# Patient Record
Sex: FEMALE | Race: WHITE | Employment: OTHER | URBAN - METROPOLITAN AREA
[De-identification: names, ages, dates, MRNs, and addresses within clinical notes are randomized per-mention and may not be internally consistent; named-entity substitution may affect disease eponyms.]

---

## 2021-10-12 ENCOUNTER — HOSPITAL ENCOUNTER (OUTPATIENT)
Dept: PHYSICAL THERAPY | Age: 61
Discharge: HOME | End: 2021-10-12
Payer: MEDICAID

## 2021-10-12 PROCEDURE — 97162 PT EVAL MOD COMPLEX 30 MIN: CPT | Performed by: PHYSICAL THERAPIST

## 2021-10-12 NOTE — PROGRESS NOTES
PT DAILY TREATMENT NOTE     Patient Name: Roseline Lombard  Date:10/12/2021  : 1960  [x]  Patient  Verified  Payor: Mariia Laine / Plan: [address] [place] [address] [place] [employer] / Product Type: HMO /    In time:124  Out time:155  Total Treatment Time (min): 31  Visit #: 1 of     Treatment Area: Other low back pain [M54.59]  Right thigh pain [M79.651]    SUBJECTIVE  Pain Level (0-10 scale): 3/10  Any medication changes, allergies to medications, adverse drug reactions, diagnosis change, or new procedure performed?: [x] No    [] Yes (see summary sheet for update)  Subjective functional status/changes:   [] No changes reported  Pt is a 64year old female with subjective complaints of deep glut pain that radiates down the R HS and into the calf that started on 2021. She reports that she was carrying a bag and the weight shifted as she was going down the stairs and she heard a pop in her leg. She reports immediate pain in the deep R glut down her posterior leg. She was diagnosed with HS strain and treated with massage therapy. She reports improvements however, she continues to have pain. Currently she rates her pain a 3/10 in the HS. Functional limitations include sitting, stair negotiation, driving in a car, and prolonged walking. Her symptoms are eased with rest/ice and attending massage therapy 1 time a week or 10 days. She had an X-ray of the pelvis which was negative. Negative for red flags.  FOTO: 39/100    OBJECTIVE    Modality rationale:    Min Type Additional Details    [] Estim:  []Unatt       []IFC  []Premod                        []Other:  []w/ice   []w/heat  Position:  Location:    [] Estim: []Att    []TENS instruct  []NMES                    []Other:  []w/US   []w/ice   []w/heat  Position:  Location:    []  Traction: [] Cervical       []Lumbar                       [] Prone          []Supine                       []Intermittent   []Continuous Lbs:  [] before manual  [] after manual    []  Ultrasound: []Continuous   [] Pulsed                           []1MHz   []3MHz W/cm2:  Location:    []  Iontophoresis with dexamethasone         Location: [] Take home patch   [] In clinic    []  Ice     []  heat  []  Ice massage  []  Laser   []  Anodyne Position:  Location:    []  Laser with stim  []  Other:  Position:  Location:    []  Vasopneumatic Device  Pre-treatment girth:   Post-treatment girth:   Measured at landmark location:  Pressure:       [] lo [] med [] hi   Temperature: [] lo [] med [] hi   [] Skin assessment post-treatment:  []intact []redness- no adverse reaction    []redness  adverse reaction:   Vasopnuematic compression justification:  Per bilateral girth measures taken and listed above the edema is considered significant and having an impact on the patient's strength, transfers, self care and ADLs    31 min [x]Eval                  []Re-Eval          With   [] TE   [] TA   [] neuro   [] other: Patient Education: [x] Review HEP    [] Progressed/Changed HEP based on:   [] positioning   [] body mechanics   [] transfers   [] heat/ice application    [] other:      Other Objective/Functional Measures:   Upon evaluation, pt ambulates with normalized gait pattern. Stair negotiation: pt ambulates with a step too pattern using B UE's for support. AROM of the lumbar spine is WNLs in all planes. Negative repeated movement testing of the lumbar spine. Pt is able to perform a supine bridge 50% with slight discomfort in the R HS. B HS length: -20 deg from vertical B. B hip strength is grossly a 4/5 except B hip extension is a 3+/5. B knee flexion strength is a 4/5 with pain on the R. SI is in good alignment.       Pain Level (0-10 scale) post treatment: 3/10    ASSESSMENT/Changes in Function:   [x]  See Plan of Care  []  See progress note/recertification  []  See Discharge Summary         Progress towards goals / Updated goals:  PER POC     PLAN  []  Upgrade activities as tolerated     [x]  Continue plan of care  [] Update interventions per flow sheet       []  Discharge due to:_  [x]  Other: 2-3x/week for 4 weeks    Justification for Eval Code Complexity:  Patient History : chronicity, history of low back pain  Examination see exam   Clinical Presentation: evolving  Clinical Decision Making : FOTO : [facility], DPT 10/12/2021  411 PM    Future Appointments   Date Time Provider Allison Yoon   10/14/2021 10:45 AM [facility]   10/19/2021 10:45 AM [facility] PT [facility]   10/21/2021 10:45 AM Elise Standing, PTA [facility]   10/25/2021 11:30 AM [facility] PT [facility]   10/26/2021  9:15 AM [facility]   10/28/2021  1:15 PM Elise Standing, PTA [facility]   11/1/2021 10:00 AM Sonny Yuan, [facility]OT [facility]   11/4/2021 11:30 AM Betty Wagner, PT [facility]   11/8/2021 10:00 AM MARIO Cord[facility]T [facility]   11/11/2021 11:30 AM Elise Standing, PTA [facility]

## 2021-10-12 NOTE — PROGRESS NOTES
[address] PHYSICAL THERAPY AT [facility]  [address] [facility] [address], Luan Rogers  Phone [phone]  Fax [phone] / [address] [address] PHYSICAL THERAPY SERVICES  Patient Name: Delmer Lopez : 1960   Medical   Diagnosis: Other low back pain [M54.59]  Right thigh pain [M79.651] Treatment Diagnosis: R HS strain   Onset Date: 21     Referral Source: Renato Lai MD [facility] 10/12/2021   Prior Hospitalization: See medical history Provider #: [id number]   Prior Level of Function: IND    Comorbidities: Heart disease, diabetes, HBP, scoliosis   Medications: Verified on Patient Summary List   The Plan of Care and following information is based on the information from the initial evaluation.   =================================================================================  Assessment / key information:  Pt is a 64year old female with subjective complaints of deep glut pain that radiates down the R HS and into the calf that started on 2021. She reports that she was carrying a bag and the weight shifted as she was going down the stairs and she heard a pop in her leg. She reports immediate pain in the deep R glut down her posterior leg. She was diagnosed with HS strain and treated with massage therapy. She reports improvements however, she continues to have pain. Currently she rates her pain a 3/10 in the HS. Functional limitations include sitting, stair negotiation, driving in a car, and prolonged walking. Her symptoms are eased with rest/ice and attending massage therapy 1 time a week or 10 days. She had an X-ray of the pelvis which was negative. Negative for red flags. FOTO: 39/100. Upon evaluation, pt ambulates with normalized gait pattern. Stair negotiation: pt ambulates with a step too pattern using B UE's for support. AROM of the lumbar spine is WNLs in all planes.  Negative repeated movement testing of the lumbar spine. Pt is able to perform a supine bridge 50% with slight discomfort in the R HS. B HS length: -20 deg from vertical B. B hip strength is grossly a 4/5 except B hip extension is a 3+/5. B knee flexion strength is a 4/5 with pain on the R. SI is in good alignment. Pt would benefit from a course of skilled PT to address above deficits to return to PLOF symptom free.   =================================================================================  Eval Complexity: History: HIGH Complexity :3+ comorbidities / personal factors will impact the outcome/ POC Exam:HIGH Complexity : 4+ Standardized tests and measures addressing body structure, function, activity limitation and / or participation in recreation  Presentation: MEDIUM Complexity : Evolving with changing characteristics  Clinical Decision Making:MEDIUM Complexity : FOTO score of 26-74Overall Complexity:MEDIUM  Problem List: pain affecting function, decrease strength, edema affecting function, impaired gait/ balance, decrease ADL/ functional abilitiies, decrease activity tolerance, decrease flexibility/ joint mobility and decrease transfer abilities   Treatment Plan may include any combination of the following: Therapeutic exercise, Therapeutic activities, Neuromuscular re-education, Physical agent/modality, Gait/balance training, Manual therapy and Self Care training  Patient / Family readiness to learn indicated by: asking questions and trying to perform skills  Persons(s) to be included in education: patient (P)  Barriers to Learning/Limitations: None  Measures taken:    Patient Goal (s): Pain free, learn stretches, drive for 1 hour, walk 2 miles   Patient self reported health status: fair  Rehabilitation Potential: good   Short Term Goals: To be accomplished in  1  weeks:  1. Pt will be IND and compliant with HEP for self-management of symptoms.  Long Term Goals: To be accomplished in  4  weeks:  1.  Pt will improve B hip strength to 4+/5 to improve gait stability to be able to ambulate 2 miles without pain. 2. Pt will improve core/lumbar strength to 5/5 to improve postural stability with dynamic activities. 3. Pt will improve R Hamstring strength to 5/5 to improve ability to ascend/descend stairs. 4. Pt will report 75% reduction of symptoms to be able to sit and drive for 1 hour. 5. Pt will improve FOTO score to at least 52/100 as a functional indicator of improved mobility. Frequency / Duration:   Patient to be seen  2-3  times per week for 4  weeks:     Patient / Caregiver education and instruction: exercises  Therapist Signature: Claudetta Byers, DPT Date: 44/42/4871   Certification Period: NA Time: 4:11 PM   ===========================================================================================  I certify that the above Physical Therapy Services are being furnished while the patient is under my care. I agree with the treatment plan and certify that this therapy is necessary. Physician Signature:        Date:       Time:     Please sign and return to [facility] or you may fax the signed copy to [phone]. Thank you.   Simmie Goltz, MD

## 2021-10-14 ENCOUNTER — HOSPITAL ENCOUNTER (OUTPATIENT)
Dept: PHYSICAL THERAPY | Age: 61
Discharge: HOME | End: 2021-10-14
Payer: MEDICAID

## 2021-10-14 PROCEDURE — 97110 THERAPEUTIC EXERCISES: CPT | Performed by: PHYSICAL THERAPIST

## 2021-10-14 PROCEDURE — 97112 NEUROMUSCULAR REEDUCATION: CPT | Performed by: PHYSICAL THERAPIST

## 2021-10-14 PROCEDURE — 97530 THERAPEUTIC ACTIVITIES: CPT | Performed by: PHYSICAL THERAPIST

## 2021-10-14 NOTE — PROGRESS NOTES
PT DAILY TREATMENT NOTE     Patient Name: Masoud Drafts  Date:10/14/2021  : 1960  [x]  Patient  Verified  Payor: Azar Kimball / Plan: [address] [place] [address] 60 / Product Type: Managed Care Medicaid /    In time:1050  Out time:1150  Total Treatment Time (min): 60  Visit #: 2 of   Treatment Area: Other low back pain [M54.59]  Right thigh pain [M79.651]    SUBJECTIVE  Pain Level (0-10 scale): 2/10  Any medication changes, allergies to medications, adverse drug reactions, diagnosis change, or new procedure performed?: [x] No    [] Yes (see summary sheet for update)  Subjective functional status/changes:   [] No changes reported  Pt reports that she was sore for a few days after evaluation last session.      OBJECTIVE    Modality rationale: decrease inflammation, decrease pain and increase tissue extensibility to improve the patients ability to reduce post session soreness    Min Type Additional Details    [] Estim:  []Unatt       []IFC  []Premod                        []Other:  []w/ice   []w/heat  Position:  Location:    [] Estim: []Att    []TENS instruct  []NMES                    []Other:  []w/US   []w/ice   []w/heat  Position:  Location:    []  Traction: [] Cervical       []Lumbar                       [] Prone          []Supine                       []Intermittent   []Continuous Lbs:  [] before manual  [] after manual    []  Ultrasound: []Continuous   [] Pulsed                           []1MHz   []3MHz W/cm2:  Location:    []  Iontophoresis with dexamethasone         Location: [] Take home patch   [] In clinic   10 [x]  Ice     []  heat  []  Ice massage  []  Laser   []  Anodyne Position: prone  Location: R HS    []  Laser with stim  []  Other:  Position:  Location:    []  Vasopneumatic Device  Pre-treatment girth:   Post-treatment girth:   Measured at landmark location:  Pressure:       [] lo [] med [] hi   Temperature: [] lo [] med [] hi   [] Skin assessment post-treatment:  []intact []redness- no adverse reaction    []redness  adverse reaction:   Vasopnuematic compression justification:  Per bilateral girth measures taken and listed above the edema is considered significant and having an impact on the patient's strength, balance, transfers, self care and ADLs      15 min Therapeutic Exercise:  [] See flow sheet :bike, HS/Incline stretch, seated HS curls, LAQ   Rationale: increase strength to improve the patients ability to improve standing tolerance     8 min Therapeutic Activity:  []  See flow sheet : Step up, lateral step up    Rationale: increase strength  to improve the patients ability to ability to perform stair negotiation     17 min Neuromuscular Re-education:  []  See flow sheet : SLRx2, PPT progression, clamshells   Rationale: increase strength, improve balance and increase proprioception  to improve the patients ability to improve postural stability with balance activities to reduce risk of falls  : With   [] TE   [] TA   [] neuro   [] other: Patient Education: [x] Review HEP    [] Progressed/Changed HEP based on:   [] positioning   [] body mechanics   [] transfers   [] heat/ice application    [] other:      Other Objective/Functional Measures:      Pain Level (0-10 scale) post treatment: 1/1-    ASSESSMENT/Changes in Function:   Pt tolerated all therex without increased pain. She was challenged with 6\" step up secondary to pain with hip flexion. Reduced soreness post ice treatment. Continue to progress as tolerated. Assess effects of treatment NV. Patient will continue to benefit from skilled PT services to modify and progress therapeutic interventions, address functional mobility deficits, address ROM deficits, address strength deficits, analyze and address soft tissue restrictions, assess and modify postural abnormalities and address imbalance/dizziness to attain remaining goals.      []  See Plan of Care  []  See progress note/recertification  []  See Discharge Summary         Progress towards goals / Updated goals:  1st FU since eval, assess goals NV    PLAN  []  Upgrade activities as tolerated     [x]  Continue plan of care  []  Update interventions per flow sheet       []  Discharge due to:_  [x]  Other: check goals NV      Yash Cancino DPT 10/14/2021  121  PM    Future Appointments   Date Time Provider Allison Yoon   10/19/2021 10:45 AM [facility] PT [place] [facility]   10/21/2021 10:45 AM Bessie Oleary PTA [facility]   10/25/2021 11:30 AM [facility] PT [place] [facility]   10/26/2021  9:15 AM [facility]   10/28/2021  1:15 PM [facility]   11/1/2021 10:00 AM [facility]ungT [facility]   11/4/2021 11:30 AM Kimberlee Munoz PT [facility]   11/8/2021 10:00 AM [facility]IO [facility]T [facility]   11/11/2021 11:30 AM [facility]

## 2021-10-19 ENCOUNTER — APPOINTMENT (OUTPATIENT)
Dept: PHYSICAL THERAPY | Age: 61
End: 2021-10-19
Payer: MEDICAID

## 2021-10-21 ENCOUNTER — HOSPITAL ENCOUNTER (OUTPATIENT)
Dept: PHYSICAL THERAPY | Age: 61
Discharge: HOME | End: 2021-10-21
Payer: MEDICAID

## 2021-10-21 PROCEDURE — 97112 NEUROMUSCULAR REEDUCATION: CPT

## 2021-10-21 PROCEDURE — 97530 THERAPEUTIC ACTIVITIES: CPT

## 2021-10-21 PROCEDURE — 97110 THERAPEUTIC EXERCISES: CPT

## 2021-10-21 NOTE — PROGRESS NOTES
PT DAILY TREATMENT NOTE     Patient Name: Paula Mireles  AFVT:  : 1960  [x]  Patient  Verified  Payor: Esperanza Bautistadiallo / Plan: [address] [place] [address] / Product Type: Managed Care Medicaid /    In time:  Out time:11:50  Total Treatment Time (min): 65  Visit #: 3 of   Treatment Area: Other low back pain [M54.59]  Right thigh pain [M79.651]    SUBJECTIVE  Pain Level (0-10 scale): 2/10  Any medication changes, allergies to medications, adverse drug reactions, diagnosis change, or new procedure performed?: [x] No    [] Yes (see summary sheet for update)  Subjective functional status/changes:   [] No changes reported  I think it is getting better. I am able to walk better. Going down the stairs is better but when I try to go up the steps leading with my(R) LE, I can't do it - still weak and painful.     OBJECTIVE    Modality rationale: decrease inflammation, decrease pain and increase tissue extensibility to improve the patients ability to reduce post session soreness    Min Type Additional Details    [] Estim:  []Unatt       []IFC  []Premod                        []Other:  []w/ice   []w/heat  Position:  Location:    [] Estim: []Att    []TENS instruct  []NMES                    []Other:  []w/US   []w/ice   []w/heat  Position:  Location:    []  Traction: [] Cervical       []Lumbar                       [] Prone          []Supine                       []Intermittent   []Continuous Lbs:  [] before manual  [] after manual    []  Ultrasound: []Continuous   [] Pulsed                           []1MHz   []3MHz W/cm2:  Location:    []  Iontophoresis with dexamethasone         Location: [] Take home patch   [] In clinic   10 [x]  Ice     []  heat  []  Ice massage  []  Laser   []  Anodyne Position: prone  Location: R HS    []  Laser with stim  []  Other:  Position:  Location:    []  Vasopneumatic Device  Pre-treatment girth:   Post-treatment girth:   Measured at landmark location:  Pressure: [] lo [] med [] hi   Temperature: [] lo [] med [] hi   [] Skin assessment post-treatment:  []intact []redness- no adverse reaction    []redness  adverse reaction:   Vasopnuematic compression justification:  Per bilateral girth measures taken and listed above the edema is considered significant and having an impact on the patient's strength, balance, transfers, self care and ADLs      15 min Therapeutic Exercise:  [x] See flow sheet :bike, HS/Incline stretch, seated HS curls to (L) with green, LAQ with #2 to (L) , prone hamstring curls with #2 to (L) and )# 0(R)     Rationale: increase strength to improve the patients ability to improve standing tolerance     20 min Therapeutic Activity:  [x]  See flow sheet : Step up, lateral step up   Added 3 way hip with green t-band    Rationale: increase strength  to improve the patients ability to ability to perform stair negotiation     20 min Neuromuscular Re-education:  [x]  See flow sheet : SLRx2, PPT progression, clamshells   Rationale: increase strength, improve balance and increase proprioception  to improve the patients ability to improve postural stability with balance activities to reduce risk of falls  : With   [] TE   [] TA   [] neuro   [] other: Patient Education: [x] Review HEP    [] Progressed/Changed HEP based on:   [] positioning   [] body mechanics   [] transfers   [] heat/ice application    [] other:      Other Objective/Functional Measures: Added 3 way hip with green t-band - noted challenged in all 3 direction with (B) LE  Added prone hamstring curls   Added clams with t-band and partial bridge with hip adduction with ball     GOALS  4. Pt will report 75% reduction of symptoms to be able to sit and drive for 1 hour.  Patient reported no pain with 35 min driving 52/99/7459    Pain Level (0-10 scale) post treatment: 1/10    ASSESSMENT/Changes in Function:   Noted weakness with 6\" step up forward and side steps with (B) LE - patient was able to eventually able to perform with no use of hands to both side with some hesitance however with improved tolerance  When performing seated LAQ - patient was unable to perform full knee extension with (R) - attempted holding the patella medially for proper tracking and tapping the VMO - patient was then able to perform full LAQ with less pain and \"popping\" of patella       Patient will continue to benefit from skilled PT services to modify and progress therapeutic interventions, address functional mobility deficits, address ROM deficits, address strength deficits, analyze and address soft tissue restrictions, assess and modify postural abnormalities and address imbalance/dizziness to attain remaining goals. [x]  See Plan of Care  []  See progress note/recertification  []  See Discharge Summary         Progress towards goals / Updated goals: · Short Term Goals: To be accomplished in  1  weeks:  1. Pt will be IND and compliant with HEP for self-management of symptoms. · Long Term Goals: To be accomplished in  4  weeks:  1. Pt will improve B hip strength to 4+/5 to improve gait stability to be able to ambulate 2 miles without pain. 2. Pt will improve core/lumbar strength to 5/5 to improve postural stability with dynamic activities. 3. Pt will improve R Hamstring strength to 5/5 to improve ability to ascend/descend stairs. 4. Pt will report 75% reduction of symptoms to be able to sit and drive for 1 hour. Patient reported no pain with 35 min driving 94/44/9252  5. Pt will improve FOTO score to at least 52/100 as a functional indicator of improved mobility.      PLAN  []  Upgrade activities as tolerated     [x]  Continue plan of care  []  Update interventions per flow sheet       []  Discharge due to:_  []  Other:     Sonny Madera, LUIZ 10/21/2021  121  PM    Future Appointments   Date Time Provider Allison Yoon   10/25/2021 11:30 AM [address] 3 [facility]   10/26/2021  9:15 AM [address] Manju Azevedo   10/28/2021  1:15 PM Nacho Mendez PTA [facility]   11/1/2021 10:00 AM Lovely Red DPT [facility]   11/4/2021 11:30 AM Hector Rojas PT [facility]   11/8/2021 10:00 AM Lovely Red DPT [facility]   11/11/2021 11:30 AM Nacho Mendez PTA [facility]

## 2021-10-25 ENCOUNTER — HOSPITAL ENCOUNTER (OUTPATIENT)
Dept: PHYSICAL THERAPY | Age: 61
Discharge: HOME | End: 2021-10-25
Payer: MEDICAID

## 2021-10-25 PROCEDURE — 97530 THERAPEUTIC ACTIVITIES: CPT | Performed by: GENERAL ACUTE CARE HOSPITAL

## 2021-10-25 PROCEDURE — 97110 THERAPEUTIC EXERCISES: CPT | Performed by: GENERAL ACUTE CARE HOSPITAL

## 2021-10-25 PROCEDURE — 97112 NEUROMUSCULAR REEDUCATION: CPT | Performed by: GENERAL ACUTE CARE HOSPITAL

## 2021-10-25 NOTE — PROGRESS NOTES
PT DAILY TREATMENT NOTE     Patient Name: Marie Mercedes  Date:10/25/2021  : 1960  [x]  Patient  Verified  Payor: Phyllis Fee / Plan: [address] [place] [address] 60 / Product Type: Managed Care Medicaid /    In time: 11:30  Out time: 12:14  Total Treatment Time (min): 44  Visit #: 4 of   Treatment Area: Other low back pain [M54.59]  Right thigh pain [M79.651]    SUBJECTIVE  Pain Level (0-10 scale): 10   Any medication changes, allergies to medications, adverse drug reactions, diagnosis change, or new procedure performed?: [x] No    [] Yes (see summary sheet for update)  Subjective functional status/changes:   [] No changes reported  Pt reports having increased pain after last session in R proximal HS. Pt continues to report increased pain with prolonged sitting and stairs.      OBJECTIVE    Modality rationale: decrease inflammation, decrease pain and increase tissue extensibility to improve the patients ability to reduce post session soreness    Min Type Additional Details    [] Estim:  []Unatt       []IFC  []Premod                        []Other:  []w/ice   []w/heat  Position:  Location:    [] Estim: []Att    []TENS instruct  []NMES                    []Other:  []w/US   []w/ice   []w/heat  Position:  Location:    []  Traction: [] Cervical       []Lumbar                       [] Prone          []Supine                       []Intermittent   []Continuous Lbs:  [] before manual  [] after manual    []  Ultrasound: []Continuous   [] Pulsed                           []1MHz   []3MHz W/cm2:  Location:    []  Iontophoresis with dexamethasone         Location: [] Take home patch   [] In clinic   PD - will ice at home [x]  Ice     []  heat  []  Ice massage  []  Laser   []  Anodyne Position: prone  Location: R HS    []  Laser with stim  []  Other:  Position:  Location:    []  Vasopneumatic Device  Pre-treatment girth:   Post-treatment girth:   Measured at landmark location:  Pressure:       [] lo [] med [] hi   Temperature: [] lo [] med [] hi   [x] Skin assessment post-treatment:  [x]intact []redness- no adverse reaction    []redness  adverse reaction:   Vasopnuematic compression justification:  Per bilateral girth measures taken and listed above the edema is considered significant and having an impact on the patient's strength, balance, transfers, self care and ADLs      19 min Therapeutic Exercise:  [x] See flow sheet :bike, HS/Incline stretch, seated HS curls to (L) with green, LAQ with #2 to (L) , prone hamstring curls with #2 to (L) and # 0(R)     Rationale: increase strength to improve the patients ability to improve standing tolerance     10 min Therapeutic Activity:  [x]  See flow sheet : Step up, lateral step up, bridges      Rationale: increase strength  to improve the patients ability to ability to perform stair negotiation     12 min Neuromuscular Re-education:  [x]  See flow sheet :  PPT progression, clamshells   Rationale: increase strength, improve balance and increase proprioception  to improve the patients ability to improve postural stability with balance activities to reduce risk of falls    3 minutes of manual therapy: TRP/DTM at R HS insertion on ischial tuberosity   Rationale: to reduce pain and increase tissue elasticity for improved squatting and stair negotiation. With   [] TE   [] TA   [] neuro   [] other: Patient Education: [x] Review HEP    [] Progressed/Changed HEP based on:   [] positioning   [] body mechanics   [] transfers   [] heat/ice application    [] other:      Other Objective/Functional Measures: Added bridges (~50%)  +ttp at proximal HS insertion on ischial tuberosity    Pain Level (0-10 scale) post treatment: 1/10     ASSESSMENT/Changes in Function:   Pt continue to demo positive response to tactile facilitation of VMO during LAQ for full extension, but able to complete without TC during latter reps.  Pain at proximal HS reported during forward step ups, but otherwise able to complete all therex without exacerbation of sx. Did not significantly progress program secondary to reports of increased pain last session - assess response and plan to progress as tolerated NV. Pt declined modalities as needed to make a phone call, plans to ice at home. Patient will continue to benefit from skilled PT services to modify and progress therapeutic interventions, address functional mobility deficits, address ROM deficits, address strength deficits, analyze and address soft tissue restrictions, assess and modify postural abnormalities and address imbalance/dizziness to attain remaining goals. [x]  See Plan of Care  []  See progress note/recertification  []  See Discharge Summary         Progress towards goals / Updated goals: · Short Term Goals: To be accomplished in  1  weeks:  1. Pt will be IND and compliant with HEP for self-management of symptoms. Current: pt reports compliance with HEP (10/25/21)  · Long Term Goals: To be accomplished in  4  weeks:  1. Pt will improve B hip strength to 4+/5 to improve gait stability to be able to ambulate 2 miles without pain. 2. Pt will improve core/lumbar strength to 5/5 to improve postural stability with dynamic activities. 3. Pt will improve R Hamstring strength to 5/5 to improve ability to ascend/descend stairs. 4. Pt will report 75% reduction of symptoms to be able to sit and drive for 1 hour. Patient reported no pain with 35 min driving 25/74/8900  5. Pt will improve FOTO score to at least 52/100 as a functional indicator of improved mobility. PLAN  [x]  Upgrade activities as tolerated     [x]  Continue plan of care  []  Update interventions per flow sheet       []  Discharge due to:_  [x]  Other:  Plan to initiate gentle loading of R HS pending tolerance to today's session at NV.      Abimael Oar, PT 10/25/2021  121  PM    Future Appointments   Date Time Provider Allison Yoon   10/25/2021 11:30 AM [facility] [place] [address] [facility]   10/26/2021  9:15 AM [facility] 4 [facility] JADIEL   10/28/2021  1:15 PM Gene Rosas PTA [facility]   11/1/2021 10:00 AM Isabelle Bernstein, AYUSH [facility]   11/4/2021 11:30 AM Mary Kay Carreon PT [facility]   11/8/2021 10:00 AM Isabelle Bernstein, MARIOT [facility]   11/11/2021 11:30 AM Gene Rosas PTA [facility]

## 2021-10-28 ENCOUNTER — HOSPITAL ENCOUNTER (OUTPATIENT)
Dept: PHYSICAL THERAPY | Age: 61
Discharge: HOME | End: 2021-10-28
Payer: MEDICAID

## 2021-10-28 PROCEDURE — 97112 NEUROMUSCULAR REEDUCATION: CPT

## 2021-10-28 PROCEDURE — 97530 THERAPEUTIC ACTIVITIES: CPT

## 2021-10-28 PROCEDURE — 97110 THERAPEUTIC EXERCISES: CPT

## 2021-10-28 NOTE — PROGRESS NOTES
PT DAILY TREATMENT NOTE     Patient Name: Finesse Barnhart  KCCW:[id number]  : 1960  [x]  Patient  Verified  Payor: Bryson Howe / Plan: [address] [place] [address] 60 / Product Type: Managed Care Medicaid /    In time: 1:15  Out time: 2:05  Total Treatment Time (min): 50  Visit #: 5 of   Treatment Area: Other low back pain [M54.59]  Right thigh pain [M79.651]    SUBJECTIVE  Pain Level (0-10 scale): 5/10   Any medication changes, allergies to medications, adverse drug reactions, diagnosis change, or new procedure performed?: [x] No    [] Yes (see summary sheet for update)  Subjective functional status/changes:   [] No changes reported  I felt really good after the last time I was here, but I don't know what happened I woke up this morning at 3 am and had a lot of pain in my leg.  All I did the day before was sitting and quilting      OBJECTIVE    Modality rationale: decrease inflammation, decrease pain and increase tissue extensibility to improve the patients ability to reduce post session soreness    Min Type Additional Details    [] Estim:  []Unatt       []IFC  []Premod                        []Other:  []w/ice   []w/heat  Position:  Location:    [] Estim: []Att    []TENS instruct  []NMES                    []Other:  []w/US   []w/ice   []w/heat  Position:  Location:    []  Traction: [] Cervical       []Lumbar                       [] Prone          []Supine                       []Intermittent   []Continuous Lbs:  [] before manual  [] after manual    []  Ultrasound: []Continuous   [] Pulsed                           []1MHz   []3MHz W/cm2:  Location:    []  Iontophoresis with dexamethasone         Location: [] Take home patch   [] In clinic   PD - will ice at home [x]  Ice     []  heat  []  Ice massage  []  Laser   []  Anodyne Position: prone  Location: R HS    []  Laser with stim  []  Other:  Position:  Location:    []  Vasopneumatic Device  Pre-treatment girth:   Post-treatment girth: Measured at landmark location:  Pressure:       [] lo [] med [] hi   Temperature: [] lo [] med [] hi   [x] Skin assessment post-treatment:  [x]intact []redness- no adverse reaction    []redness  adverse reaction:   Vasopnuematic compression justification:  Per bilateral girth measures taken and listed above the edema is considered significant and having an impact on the patient's strength, balance, transfers, self care and ADLs      19 min Therapeutic Exercise:  [x] See flow sheet :bike, HS/Incline stretch, seated HS curls to (L) with green, LAQ with #3 to (L) , prone hamstring curls with #3 to (L) and # 0(R)     Rationale: increase strength to improve the patients ability to improve standing tolerance     12 min Therapeutic Activity:  [x]  See flow sheet : Step up, lateral step up, bridges   Added hip IR and ER with green t-band in prone      Rationale: increase strength  to improve the patients ability to ability to perform stair negotiation     12 min Neuromuscular Re-education:  [x]  See flow sheet :  PPT progressionvito   Rationale: increase strength, improve balance and increase proprioception  to improve the patients ability to improve postural stability with balance activities to reduce risk of falls    7 minutes of manual therapy: TRP/DTM at R HS insertion on ischial tuberosity and piriformis release  Rationale: to reduce pain and increase tissue elasticity for improved squatting and stair negotiation.            With   [] TE   [] TA   [] neuro   [] other: Patient Education: [x] Review HEP    [] Progressed/Changed HEP based on:   [] positioning   [] body mechanics   [] transfers   [] heat/ice application    [] other:      Other Objective/Functional Measures:  Performed TPR to insertion of ischial tuberosity and piriformis (release) - presented with trigger point however no increase pain with attempting to release   Patient was able to perform all exercises without pain to the distal hamstrings/glut area  Gave green t-band for home use  Patient was able to ascend and descend stair without use of handrail - reciprocal pattern with no pain and improved strength  GOALS   3. Pt will improve R Hamstring strength to 5/5 to improve ability to ascend/descend stairs. Patient was able to ascend and descend stair without use of handrail - reciprocal pattern with no pain and improved strength 10/28/2021    Pain Level (0-10 scale) post treatment: 1/10     ASSESSMENT/Changes in Function:    Patient will continue to benefit from skilled PT services to modify and progress therapeutic interventions, address functional mobility deficits, address ROM deficits, address strength deficits, analyze and address soft tissue restrictions, assess and modify postural abnormalities and address imbalance/dizziness to attain remaining goals. [x]  See Plan of Care  []  See progress note/recertification  []  See Discharge Summary         Progress towards goals / Updated goals: · Short Term Goals: To be accomplished in  1  weeks:  1. Pt will be IND and compliant with HEP for self-management of symptoms. Current: pt reports compliance with HEP (10/25/21)  · Long Term Goals: To be accomplished in  4  weeks:  1. Pt will improve B hip strength to 4+/5 to improve gait stability to be able to ambulate 2 miles without pain. 2. Pt will improve core/lumbar strength to 5/5 to improve postural stability with dynamic activities. 3. Pt will improve R Hamstring strength to 5/5 to improve ability to ascend/descend stairs. Patient was able to ascend and descend stair without use of handrail - reciprocal pattern with no pain and improved strength 10/28/2021  4. Pt will report 75% reduction of symptoms to be able to sit and drive for 1 hour. Patient reported no pain with 35 min driving 55/34/8903  5. Pt will improve FOTO score to at least 52/100 as a functional indicator of improved mobility.      PLAN  [x]  Upgrade activities as tolerated [x]  Continue plan of care  []  Update interventions per flow sheet       []  Discharge due to:_  []  Other:      Haven Gottlieb, PTA 10/28/2021  121  PM    Future Appointments   Date Time Provider Allison Yoon   11/1/2021 10:00 AM MARIO RodriguezT [facility]   11/4/2021 11:30 AM Staci Lazo PT [facility]   11/8/2021 10:00 AM Lucia Paredes DPT [facility]   11/11/2021 11:30 AM [facility]

## 2021-11-04 ENCOUNTER — HOSPITAL ENCOUNTER (OUTPATIENT)
Dept: PHYSICAL THERAPY | Age: 61
Discharge: HOME | End: 2021-11-04
Payer: MEDICAID

## 2021-11-04 PROCEDURE — 97112 NEUROMUSCULAR REEDUCATION: CPT

## 2021-11-04 PROCEDURE — 97530 THERAPEUTIC ACTIVITIES: CPT

## 2021-11-04 PROCEDURE — 97110 THERAPEUTIC EXERCISES: CPT

## 2021-11-04 NOTE — PROGRESS NOTES
PT DAILY TREATMENT NOTE     Patient Name: Funmi Thurman  Date:2021  : 1960  [x]  Patient  Verified  Payor: Trude Holstein / Plan: [facility] / Product Type: Managed Care Medicaid /    In time: 11:30  Out time: 12:29   Total Treatment Time (min): 59   Visit #: 6 of   Treatment Area: Other low back pain [M54.59]  Right thigh pain [M79.651]    SUBJECTIVE  Pain Level (0-10 scale): 0/10   Any medication changes, allergies to medications, adverse drug reactions, diagnosis change, or new procedure performed?: [x] No    [] Yes (see summary sheet for update)  Subjective functional status/changes:   [] No changes reported  The pt reports she has no back pain right now, but keeps having discomfort with bending forward and going up stairs. She states is feeling a little bit better with every week.      OBJECTIVE    Modality rationale: decrease inflammation, decrease pain and increase tissue extensibility to improve the patients ability to reduce post session soreness    Min Type Additional Details    [] Estim:  []Unatt       []IFC  []Premod                        []Other:  []w/ice   []w/heat  Position:  Location:    [] Estim: []Att    []TENS instruct  []NMES                    []Other:  []w/US   []w/ice   []w/heat  Position:  Location:    []  Traction: [] Cervical       []Lumbar                       [] Prone          []Supine                       []Intermittent   []Continuous Lbs:  [] before manual  [] after manual    []  Ultrasound: []Continuous   [] Pulsed                           []1MHz   []3MHz W/cm2:  Location:    []  Iontophoresis with dexamethasone         Location: [] Take home patch   [] In clinic   10 [x]  Ice     []  heat  []  Ice massage  []  Laser   []  Anodyne Position: prone  Location: R HS    []  Laser with stim  []  Other:  Position:  Location:    []  Vasopneumatic Device  Pre-treatment girth:   Post-treatment girth:   Measured at landmark location:  Pressure: [] lo [] med [] hi   Temperature: [] lo [] med [] hi   [x] Skin assessment post-treatment:  [x]intact []redness- no adverse reaction    []redness  adverse reaction:   Vasopnuematic compression justification:  Per bilateral girth measures taken and listed above the edema is considered significant and having an impact on the patient's strength, balance, transfers, self care and ADLs      15  min Therapeutic Exercise:  [x] See flow sheet :bike, HS/Incline stretch, seated HS curls to (L) with green, LAQ with #3 to (L) , prone hamstring curls with #3 to (L) and # 0(R)     Rationale: increase strength to improve the patients ability to improve standing tolerance     15  min Therapeutic Activity:  [x]  See flow sheet : Step up, lateral step up, bridges   hip IR and ER with green t-band in prone      Rationale: increase strength  to improve the patients ability to ability to perform stair negotiation     19  min Neuromuscular Re-education:  [x]  See flow sheet :  PPT progression, clamshells   Rationale: increase strength, improve balance and increase proprioception  to improve the patients ability to improve postural stability with balance activities to reduce risk of falls    5 minutes of manual therapy: TRP/DTM at R HS insertion on ischial tuberosity and piriformis release  Rationale: to reduce pain and increase tissue elasticity for improved squatting and stair negotiation. With   [] TE   [] TA   [] neuro   [] other: Patient Education: [x] Review HEP    [] Progressed/Changed HEP based on:   [] positioning   [] body mechanics   [] transfers   [] heat/ice application    [] other:      Other Objective/Functional Measures:  Increased repetitions of clamshells      Pain Level (0-10 scale) post treatment: 1 /10     ASSESSMENT/Changes in Function:   The pt had subjective c/o of continued pain with step ups. Noted continued tenderness over proximal HS insertion on ischial tuberosity.  She required verbal cues throughout the session to emphasize eccentric control. Plan to progress exercises nv per pt tolerance. Patient will continue to benefit from skilled PT services to modify and progress therapeutic interventions, address functional mobility deficits, address ROM deficits, address strength deficits, analyze and address soft tissue restrictions, assess and modify postural abnormalities and address imbalance/dizziness to attain remaining goals. [x]  See Plan of Care  []  See progress note/recertification  []  See Discharge Summary         Progress towards goals / Updated goals: · Short Term Goals: To be accomplished in  1  weeks:  1. Pt will be IND and compliant with HEP for self-management of symptoms. Current: pt reports compliance with HEP (10/25/21)  · Long Term Goals: To be accomplished in  4  weeks:  1. Pt will improve B hip strength to 4+/5 to improve gait stability to be able to ambulate 2 miles without pain. 2. Pt will improve core/lumbar strength to 5/5 to improve postural stability with dynamic activities. Bridge to 75% 11/  3. Pt will improve R Hamstring strength to 5/5 to improve ability to ascend/descend stairs. Patient was able to ascend and descend stair without use of handrail - reciprocal pattern with no pain and improved strength 10/28/2021  4. Pt will report 75% reduction of symptoms to be able to sit and drive for 1 hour. Patient reported no pain with 35 min driving 06/93/1577, about 10% improvement 11/04/2021  5. Pt will improve FOTO score to at least 52/100 as a functional indicator of improved mobility.      PLAN  [x]  Upgrade activities as tolerated     [x]  Continue plan of care  []  Update interventions per flow sheet       []  Discharge due to:_  []  Other:      Yannick Hernandes, PT 11/4/2021  121  PM    Future Appointments   Date Time Provider Allison Yoon   11/8/2021 10:00 AM Delmer Wyman DPT [facility]   11/10/2021  1:15 PM Maria D Howe, PT [facility]

## 2021-11-08 ENCOUNTER — HOSPITAL ENCOUNTER (OUTPATIENT)
Dept: PHYSICAL THERAPY | Age: 61
Discharge: HOME | End: 2021-11-08
Payer: MEDICAID

## 2021-11-08 PROCEDURE — 97110 THERAPEUTIC EXERCISES: CPT | Performed by: PHYSICAL THERAPIST

## 2021-11-08 PROCEDURE — 97112 NEUROMUSCULAR REEDUCATION: CPT | Performed by: PHYSICAL THERAPIST

## 2021-11-08 PROCEDURE — 97530 THERAPEUTIC ACTIVITIES: CPT | Performed by: PHYSICAL THERAPIST

## 2021-11-08 NOTE — PROGRESS NOTES
PT DAILY TREATMENT NOTE     Patient Name: Delmer oLpez  Date:2021  : 1960  [x]  Patient  Verified  Payor: Sandy Bone / Plan: [address] [place] [address] 60 / Product Type: Managed Care Medicaid /    In time: 1003 Out time: 6388  Total Treatment Time (min): 55   Visit #: 7 of     Treatment Area: Other low back pain [M54.59]  Right thigh pain [M79.651]    SUBJECTIVE  Pain Level (0-10 scale): 0/10   Any medication changes, allergies to medications, adverse drug reactions, diagnosis change, or new procedure performed?: [x] No    [] Yes (see summary sheet for update)  Subjective functional status/changes:   [] No changes reported  Pt reports that her R HS has been bothering her a lot more as it disrupted her sleep last night and she couldn't get comfortable.      OBJECTIVE    Modality rationale: decrease inflammation, decrease pain and increase tissue extensibility to improve the patients ability to reduce post session soreness    Min Type Additional Details    [] Estim:  []Unatt       []IFC  []Premod                        []Other:  []w/ice   []w/heat  Position:  Location:    [] Estim: []Att    []TENS instruct  []NMES                    []Other:  []w/US   []w/ice   []w/heat  Position:  Location:    []  Traction: [] Cervical       []Lumbar                       [] Prone          []Supine                       []Intermittent   []Continuous Lbs:  [] before manual  [] after manual    []  Ultrasound: []Continuous   [] Pulsed                           []1MHz   []3MHz W/cm2:  Location:    []  Iontophoresis with dexamethasone         Location: [] Take home patch   [] In clinic   PD [x]  Ice     []  heat  []  Ice massage  []  Laser   []  Anodyne Position: prone  Location: R HS    []  Laser with stim  []  Other:  Position:  Location:    []  Vasopneumatic Device  Pre-treatment girth:   Post-treatment girth:   Measured at landmark location:  Pressure:       [] lo [] med [] hi   Temperature: [] lo [] med [] hi   [x] Skin assessment post-treatment:  [x]intact []redness- no adverse reaction    []redness  adverse reaction:   Vasopnuematic compression justification:  Per bilateral girth measures taken and listed above the edema is considered significant and having an impact on the patient's strength, balance, transfers, self care and ADLs      24 min Therapeutic Exercise:  [x] See flow sheet :bike, HS/Incline stretch, piriformis stretch,  seated HS curls to (L) with green, LAQ with #3 to (L) , prone hamstring curls with #3 to (L) and # 0(R)     Rationale: increase strength to improve the patients ability to improve standing tolerance     15 min Therapeutic Activity:  [x]  See flow sheet : Step up, lateral step up, bridges        Rationale: increase strength  to improve the patients ability to ability to perform stair negotiation     16 min Neuromuscular Re-education:  [x]  See flow sheet :  PPT progression, clamshells   Rationale: increase strength, improve balance and increase proprioception  to improve the patients ability to improve postural stability with balance activities to reduce risk of falls            With   [] TE   [] TA   [] neuro   [] other: Patient Education: [x] Review HEP    [] Progressed/Changed HEP based on:   [] positioning   [] body mechanics   [] transfers   [] heat/ice application    [] other:      Other Objective/Functional Measures:  -Increased pain in the R Glut with stationary bike  -added piriformis stretch on the R     Pain Level (0-10 scale) post treatment: 0/10     ASSESSMENT/Changes in Function:   Pt tolerated all therex without increased pain. Added R piriformis stretch to the R for further release. Pt continues to have tightness in the R glut contributing to her symptoms. Continue functional core/hip/lower extremity strengthening to reduce pain.       Patient will continue to benefit from skilled PT services to modify and progress therapeutic interventions, address functional mobility deficits, address ROM deficits, address strength deficits, analyze and address soft tissue restrictions, assess and modify postural abnormalities and address imbalance/dizziness to attain remaining goals. [x]  See Plan of Care  []  See progress note/recertification  []  See Discharge Summary         Progress towards goals / Updated goals: · Short Term Goals: To be accomplished in  1  weeks:  1. Pt will be IND and compliant with HEP for self-management of symptoms. Current: pt reports compliance with HEP (10/25/21)  · Long Term Goals: To be accomplished in  4  weeks:  1. Pt will improve B hip strength to 4+/5 to improve gait stability to be able to ambulate 2 miles without pain. 2. Pt will improve core/lumbar strength to 5/5 to improve postural stability with dynamic activities. 75% AROM 11/8/21  3. Pt will improve R Hamstring strength to 5/5 to improve ability to ascend/descend stairs. Patient was able to ascend and descend stair without use of handrail - reciprocal pattern with no pain and improved strength 10/28/2021  4. Pt will report 75% reduction of symptoms to be able to sit and drive for 1 hour. Patient reported no pain with 35 min driving 39/54/5965, about 10% improvement 11/04/2021  5. Pt will improve FOTO score to at least 52/100 as a functional indicator of improved mobility.      PLAN  [x]  Upgrade activities as tolerated     [x]  Continue plan of care  []  Update interventions per flow sheet       []  Discharge due to:_  [x]  Other: Pt due for a reassessment MIKALA White DPT 11/8/2021  1209   PM    Future Appointments   Date Time Provider Allison Yoon   11/10/2021  1:15 PM Magnolia May PT [facility]   11/15/2021  8:30 AM Hilda Mccray DPT [facility]   11/19/2021  9:15 AM Hilda Mccray DPT [facility]

## 2021-11-10 ENCOUNTER — APPOINTMENT (OUTPATIENT)
Dept: PHYSICAL THERAPY | Age: 61
End: 2021-11-10
Payer: MEDICAID

## 2021-11-11 ENCOUNTER — APPOINTMENT (OUTPATIENT)
Dept: PHYSICAL THERAPY | Age: 61
End: 2021-11-11
Payer: MEDICAID

## 2021-11-15 ENCOUNTER — HOSPITAL ENCOUNTER (OUTPATIENT)
Dept: PHYSICAL THERAPY | Age: 61
Discharge: HOME | End: 2021-11-15
Payer: MEDICAID

## 2021-11-15 PROCEDURE — 97112 NEUROMUSCULAR REEDUCATION: CPT | Performed by: PHYSICAL THERAPIST

## 2021-11-15 PROCEDURE — 97530 THERAPEUTIC ACTIVITIES: CPT | Performed by: PHYSICAL THERAPIST

## 2021-11-15 PROCEDURE — 97110 THERAPEUTIC EXERCISES: CPT | Performed by: PHYSICAL THERAPIST

## 2021-11-15 NOTE — PROGRESS NOTES
PT DAILY TREATMENT NOTE     Patient Name: Daneen Hamman  Date:11/15/2021  : 1960  [x]  Patient  Verified  Payor: Phares Dandy / Plan: [address] [place] [address] / Product Type: Managed Care Medicaid /    In time: 518  Out time: 941  Total Treatment Time (min): 66   Visit #: 8 of     Treatment Area: Other low back pain [M54.59]  Right thigh pain [M79.651]    SUBJECTIVE  Pain Level (0-10 scale): 0/10   Any medication changes, allergies to medications, adverse drug reactions, diagnosis change, or new procedure performed?: [x] No    [] Yes (see summary sheet for update)  Subjective functional status/changes:   [] No changes reported  Pt reports 30% improvements in pain in the R glut and 80% improvement in overall strength. She reports continued pain in the R Piriformis and no pain in the R HS. She reports that she continues to have pain in her R glut getting in/out of the car, going up/down steps, and bending. She reports improvements in ability to walk. Pt reports that she is able to sit for 35 min before the onset of pain.      OBJECTIVE    Modality rationale: decrease inflammation, decrease pain and increase tissue extensibility to improve the patients ability to reduce post session soreness    Min Type Additional Details    [] Estim:  []Unatt       []IFC  []Premod                        []Other:  []w/ice   []w/heat  Position:  Location:    [] Estim: []Att    []TENS instruct  []NMES                    []Other:  []w/US   []w/ice   []w/heat  Position:  Location:    []  Traction: [] Cervical       []Lumbar                       [] Prone          []Supine                       []Intermittent   []Continuous Lbs:  [] before manual  [] after manual    []  Ultrasound: []Continuous   [] Pulsed                           []1MHz   []3MHz W/cm2:  Location:    []  Iontophoresis with dexamethasone         Location: [] Take home patch   [] In clinic   PD [x]  Ice     []  heat  []  Ice massage  []  Laser []  Anodyne Position: prone  Location: R HS    []  Laser with stim  []  Other:  Position:  Location:    []  Vasopneumatic Device  Pre-treatment girth:   Post-treatment girth:   Measured at landmark location:  Pressure:       [] lo [] med [] hi   Temperature: [] lo [] med [] hi   [x] Skin assessment post-treatment:  [x]intact []redness- no adverse reaction    []redness  adverse reaction:   Vasopnuematic compression justification:  Per bilateral girth measures taken and listed above the edema is considered significant and having an impact on the patient's strength, balance, transfers, self care and ADLs      22 min Therapeutic Exercise:  [x] See flow sheet :bike, HS/Incline stretch, piriformis stretch,  seated HS curls to (L) with green, LAQ with #3 to (L) , prone hamstring curls with #3 to (L) and # 0(R)     Rationale: increase strength to improve the patients ability to improve standing tolerance     26 min Therapeutic Activity:  [x]  See flow sheet : Step up, lateral step up, bridges, PT reassessment, FOTO       Rationale: increase strength  to improve the patients ability to ability to perform stair negotiation     18 min Neuromuscular Re-education:  [x]  See flow sheet :  PPT progression, vito   Rationale: increase strength, improve balance and increase proprioception  to improve the patients ability to improve postural stability with balance activities to reduce risk of falls            With   [] TE   [] TA   [] neuro   [] other: Patient Education: [x] Review HEP    [] Progressed/Changed HEP based on:   [] positioning   [] body mechanics   [] transfers   [] heat/ice application    [] other:      Other Objective/Functional Measures:   FOTO: improved to 58/100 from 39/100 at eval  Bridge: 75% with pain  B hip strength: 4/5 grossly  B HS strength: 5/5 no pain  TrP in the R piriformis      Pain Level (0-10 scale) post treatment: 0/10     ASSESSMENT/Changes in Function:   Upon reassessment, pt denies pain in the R hamstring as that has resolved however continues to have pain in her R glut secondary to tightness in her R Piriformis. She continues to present with overall weakness of the core/lumbar spine/hips contributing to overall tightness. She would benefit from continued therapy to address these deficits to improve overall functional mobility. Patient will continue to benefit from skilled PT services to modify and progress therapeutic interventions, address functional mobility deficits, address ROM deficits, address strength deficits, analyze and address soft tissue restrictions, assess and modify postural abnormalities and address imbalance/dizziness to attain remaining goals. [x]  See Plan of Care  []  See progress note/recertification  []  See Discharge Summary         Progress towards goals / Updated goals: · Short Term Goals: To be accomplished in  1  weeks:  1. Pt will be IND and compliant with HEP for self-management of symptoms. Current: pt reports compliance with HEP (10/25/21)  · Long Term Goals: To be accomplished in  4  weeks:  1. Pt will improve B hip strength to 4+/5 to improve gait stability to be able to ambulate 2 miles without pain. Progressing 11/15/21  2. Pt will improve core/lumbar strength to 5/5 to improve postural stability with dynamic activities. 75% AROM 11/15/21  3. Pt will improve R Hamstring strength to 5/5 to improve ability to ascend/descend stairs. Met 11/15/21  4. Pt will report 75% reduction of symptoms to be able to sit and drive for 1 hour. Progressing, she is able to sit for 35 min before onset of symptoms. 11/15/21  5. Pt will improve FOTO score to at least 52/100 as a functional indicator of improved mobility.  met 11/15/21    PLAN  [x]  Upgrade activities as tolerated     [x]  Continue plan of care  []  Update interventions per flow sheet       []  Discharge due to:_  [x]  Other: Continue PT 2-3x/week for 8-12 visits    Nisha Bennett DPT 11/15/2021  1923 PM    Future Appointments   Date Time Provider Allison Yoon   11/19/2021 10:00 AM Jaren Hartley PT [facility]

## 2021-11-18 NOTE — PROGRESS NOTES
[address] [facility] AT [facility]  [address] [facility] [address] [address],[address], Trina reynoso, Luan [place]  Phone [phone]  Fax [phone]  PROGRESS NOTE  Patient Name: Funmi Thurman : 1960   Treatment/Medical Diagnosis: Other low back pain [M54.59]  Right thigh pain [M79.651]   Referral Source: Terese Dodson MD     Date of Initial Visit: 10/12/21 Attended Visits: 8 Missed Visits: 1     SUMMARY OF TREATMENT  Pt seen for 8 visits for R Hamstring strain. Therapy has included therex, theract, neuro re-education to improve overall postural stability and return to PLOF symptom free. CURRENT STATUS  Pt reports 30% improvements in pain in the R glut and 80% improvement in overall strength. She reports continued pain in the R Piriformis and no pain in the R HS. She reports that she continues to have pain in her R glut getting in/out of the car, going up/down steps, and bending. She reports improvements in ability to walk. Pt reports that she is able to sit for 35 min before the onset of pain. Upon reassessment, pt denies pain in the R hamstring as that has resolved however continues to have pain in her R glut secondary to tightness in her R Piriformis. She continues to present with overall weakness of the core/lumbar spine/hips contributing to overall tightness. She would benefit from continued therapy to address these deficits to improve overall functional mobility. Other Objective/Functional Measures: FOTO: improved to 58/100 from 39/100 at eval  Bridge: 75% with pain  B hip strength: 4/5 grossly  B HS strength: 5/5 no pain  TrP in the R piriformis    Other Objective/Functional Measures: taken at [facility] on 10/12/21  Upon evaluation, pt ambulates with normalized gait pattern. Stair negotiation: pt ambulates with a step too pattern using B UE's for support. AROM of the lumbar spine is WNLs in all planes. Negative repeated movement testing of the lumbar spine.  Pt is able to perform a supine bridge 50% with slight discomfort in the R HS. B HS length: -20 deg from vertical B. B hip strength is grossly a 4/5 except B hip extension is a 3+/5. B knee flexion strength is a 4/5 with pain on the R. SI is in good alignment.           Goal/Measure of Progress Goal Met? · Short Term Goals: To be accomplished in  1  weeks:  1. Pt will be IND and compliant with HEP for self-management of symptoms.  Current: pt reports compliance with HEP (10/25/21)  · Long Term Goals: To be accomplished in  4  weeks:  1. Pt will improve B hip strength to 4+/5 to improve gait stability to be able to ambulate 2 miles without pain. Progressing 11/15/21  2. Pt will improve core/lumbar strength to 5/5 to improve postural stability with dynamic activities. 75% AROM 11/15/21  3. Pt will improve R Hamstring strength to 5/5 to improve ability to ascend/descend stairs. Met 11/15/21  4. Pt will report 75% reduction of symptoms to be able to sit and drive for 1 hour. Progressing, she is able to sit for 35 min before onset of symptoms. 11/15/21  5. Pt will improve FOTO score to at least 52/100 as a functional indicator of improved mobility. met 11/15/21    New Goals to be achieved in __4__  weeks:  1. Pt will improve B hip strength to 4+/5 to improve gait stability to be able to ambulate 2 miles without pain. 2. Pt will improve core/lumbar strength to 5/5 to improve postural stability with dynamic activities. 3. Pt will report 75% reduction of symptoms to be able to sit and drive for 1 hour. RECOMMENDATIONS  Continue PT 2-3x/week for 8-12 visits to progress towards long-term goals. If you have any questions/comments please contact us directly at [phone]. Thank you for allowing us to assist in the care of your patient.     Therapist Signature: Lena Hassan DPT Date: 11/18/2021     Time: 8:15 AM   NOTE TO PHYSICIAN:  PLEASE COMPLETE THE ORDERS BELOW AND FAX TO   [facility]: [phone]. If you are unable to process this request in 24 hours please contact our office: [phone].    ___ I have read the above report and request that my patient continue as recommended.   ___ I have read the above report and request that my patient continue therapy with the following changes/special instructions:_________________________________________________________   ___ I have read the above report and request that my patient be discharged from therapy.      Physician Signature:        Date:       Time:    Jeanine Weiner MD

## 2021-11-19 ENCOUNTER — HOSPITAL ENCOUNTER (OUTPATIENT)
Dept: PHYSICAL THERAPY | Age: 61
Discharge: HOME | End: 2021-11-19
Payer: MEDICAID

## 2021-11-19 PROCEDURE — 97110 THERAPEUTIC EXERCISES: CPT | Performed by: PHYSICAL THERAPIST

## 2021-11-19 PROCEDURE — 97112 NEUROMUSCULAR REEDUCATION: CPT | Performed by: PHYSICAL THERAPIST

## 2021-11-19 PROCEDURE — 97530 THERAPEUTIC ACTIVITIES: CPT | Performed by: PHYSICAL THERAPIST

## 2021-11-19 NOTE — PROGRESS NOTES
PT DAILY TREATMENT NOTE     Patient Name: Diogo De Paz  Date:2021  : 1960  [x]  Patient  Verified  Payor: Javi Boogie / Plan: [facility] / Product Type: Managed Care Medicaid /    In time: 923am  Out time: 1016am  Total Treatment Time (min): 53min  Visit #: 1 of     Treatment Area: Other low back pain [M54.59]  Right thigh pain [M79.651]    SUBJECTIVE  Pain Level (0-10 scale): 0/10   Any medication changes, allergies to medications, adverse drug reactions, diagnosis change, or new procedure performed?: [x] No    [] Yes (see summary sheet for update)  Subjective functional status/changes:   [x] No changes reported  I can kick out my R knee better.      OBJECTIVE    Modality rationale: decrease inflammation, decrease pain and increase tissue extensibility to improve the patients ability to reduce post session soreness    Min Type Additional Details    [] Estim:  []Unatt       []IFC  []Premod                        []Other:  []w/ice   []w/heat  Position:  Location:    [] Estim: []Att    []TENS instruct  []NMES                    []Other:  []w/US   []w/ice   []w/heat  Position:  Location:    []  Traction: [] Cervical       []Lumbar                       [] Prone          []Supine                       []Intermittent   []Continuous Lbs:  [] before manual  [] after manual    []  Ultrasound: []Continuous   [] Pulsed                           []1MHz   []3MHz W/cm2:  Location:    []  Iontophoresis with dexamethasone         Location: [] Take home patch   [] In clinic   PD [x]  Ice     []  heat  []  Ice massage  []  Laser   []  Anodyne Position: prone  Location: R HS    []  Laser with stim  []  Other:  Position:  Location:    []  Vasopneumatic Device  Pre-treatment girth:   Post-treatment girth:   Measured at landmark location:  Pressure:       [] lo [] med [] hi   Temperature: [] lo [] med [] hi   [x] Skin assessment post-treatment:  [x]intact []redness- no adverse reaction    []redness  adverse reaction:   Vasopnuematic compression justification:  Per bilateral girth measures taken and listed above the edema is considered significant and having an impact on the patient's strength, balance, transfers, self care and ADLs      22 min Therapeutic Exercise:  [x] See flow sheet :bike, HS/Incline stretch, piriformis stretch,  seated HS curls to (L) with green, LAQ with #3 to (L) , prone hamstring curls with #3 to (L) and # 0(R)     Rationale: increase strength to improve the patients ability to improve standing tolerance     21 min Therapeutic Activity:  [x]  See flow sheet : Step up, lateral step up, bridges       Rationale: increase strength  to improve the patients ability to ability to perform stair negotiation     10 min Neuromuscular Re-education:  [x]  See flow sheet :  PPT progression, vito   Rationale: increase strength, improve balance and increase proprioception  to improve the patients ability to improve postural stability with balance activities to reduce risk of falls            With   [] TE   [] TA   [] neuro   [] other: Patient Education: [x] Review HEP    [] Progressed/Changed HEP based on:   [] positioning   [] body mechanics   [] transfers   [] heat/ice application    [] other:      Other Objective/Functional Measures:  Progressed # on R LE for LAQ and HS curls  Progressed to DL squats on TG at Lv 16, attempted wall slides but pt unable to arise with good form . Pain Level (0-10 scale) post treatment: 0/10     ASSESSMENT/Changes in Function:   Pt has good tolerance to resistance progression today. Increase to Lv 17 or 18 NV to progress functional strengthening in standing WB position. Decreased eccentric quad strength noted. Progress toward eccentric stand <>sits.  Patient will continue to benefit from skilled PT services to modify and progress therapeutic interventions, address functional mobility deficits, address ROM deficits, address strength deficits, analyze and address soft tissue restrictions, assess and modify postural abnormalities and address imbalance/dizziness to attain remaining goals. [x]  See Plan of Care  []  See progress note/recertification  []  See Discharge Summary         Progress towards goals / Updated goals: · Short Term Goals: To be accomplished in  1  weeks:  1. Pt will be IND and compliant with HEP for self-management of symptoms. Current: pt reports compliance with HEP (10/25/21)  · Long Term Goals: To be accomplished in  4  weeks:  1. Pt will improve B hip strength to 4+/5 to improve gait stability to be able to ambulate 2 miles without pain. Progressing 11/15/21  2. Pt will improve core/lumbar strength to 5/5 to improve postural stability with dynamic activities. 75% AROM 11/15/21  3. Pt will improve R Hamstring strength to 5/5 to improve ability to ascend/descend stairs. Met 11/15/21  4. Pt will report 75% reduction of symptoms to be able to sit and drive for 1 hour. Progressing, she is able to sit for 35 min before onset of symptoms. 11/15/21  5. Pt will improve FOTO score to at least 52/100 as a functional indicator of improved mobility.  met 11/15/21    PLAN  [x]  Upgrade activities as tolerated     [x]  Continue plan of care  []  Update interventions per flow sheet       []  Discharge due to:_  [x]  Other: assess goals nv    Ed Paulina, PT 11/19/2021  1209   PM    Future Appointments   Date Time Provider Allison Yoon   11/22/2021  2:45 PM Margo Groves [facility]   11/29/2021 11:30 AM MARIO RicksT [facility]   12/3/2021 10:45 AM Ellie Baxter PT [facility]   12/7/2021 11:30 AM MARIO RicksT [facility]   12/10/2021 11:30 AM Ellie Baxter PT [facility]   12/14/2021 11:30 AM Alma Rosa Puentes DPT [facility]   12/17/2021 11:30 AM Dickson Boswell PTA [facility]   12/20/2021 11:30 AM Ellie Baxter, PT [facility]   12/23/2021 10:00 AM Ellie Baxter, PT [facility]

## 2021-11-22 ENCOUNTER — APPOINTMENT (OUTPATIENT)
Dept: PHYSICAL THERAPY | Age: 61
End: 2021-11-22
Payer: MEDICAID

## 2021-11-24 ENCOUNTER — HOSPITAL ENCOUNTER (OUTPATIENT)
Dept: PHYSICAL THERAPY | Age: 61
Discharge: HOME | End: 2021-11-24
Payer: MEDICAID

## 2021-11-24 PROCEDURE — 97530 THERAPEUTIC ACTIVITIES: CPT

## 2021-11-24 PROCEDURE — 97110 THERAPEUTIC EXERCISES: CPT

## 2021-11-24 NOTE — PROGRESS NOTES
PT DAILY TREATMENT NOTE     Patient Name: Favian Caruso  Date:2021  : 1960  [x]  Patient  Verified  Payor: Catalina Ferro / Plan: [address] [place] [address] 60 / Product Type: Managed Care Medicaid /    In time: 8:36 am  Out time: 9:19   Total Treatment Time (min): 43   Visit #: 2 of     Treatment Area: Other low back pain [M54.59]  Right thigh pain [M79.651]    SUBJECTIVE  Pain Level (0-10 scale): 0/10   Any medication changes, allergies to medications, adverse drug reactions, diagnosis change, or new procedure performed?: [x] No    [] Yes (see summary sheet for update)  Subjective functional status/changes:   [x] No changes reported  The pt reports no new changes, knee is feeling okay.      OBJECTIVE    Modality rationale: decrease inflammation, decrease pain and increase tissue extensibility to improve the patients ability to reduce post session soreness    Min Type Additional Details    [] Estim:  []Unatt       []IFC  []Premod                        []Other:  []w/ice   []w/heat  Position:  Location:    [] Estim: []Att    []TENS instruct  []NMES                    []Other:  []w/US   []w/ice   []w/heat  Position:  Location:    []  Traction: [] Cervical       []Lumbar                       [] Prone          []Supine                       []Intermittent   []Continuous Lbs:  [] before manual  [] after manual    []  Ultrasound: []Continuous   [] Pulsed                           []1MHz   []3MHz W/cm2:  Location:    []  Iontophoresis with dexamethasone         Location: [] Take home patch   [] In clinic   PD [x]  Ice     []  heat  []  Ice massage  []  Laser   []  Anodyne Position: prone  Location: R HS    []  Laser with stim  []  Other:  Position:  Location:    []  Vasopneumatic Device  Pre-treatment girth:   Post-treatment girth:   Measured at landmark location:  Pressure:       [] lo [] med [] hi   Temperature: [] lo [] med [] hi   [x] Skin assessment post-treatment:  [x]intact []redness- no adverse reaction    []redness  adverse reaction:   Vasopnuematic compression justification:  Per bilateral girth measures taken and listed above the edema is considered significant and having an impact on the patient's strength, balance, transfers, self care and ADLs    15  min Therapeutic Exercise:  [x] See flow sheet :  Bike- TC  HS/Incline stretch, piriformis stretch,  seated HS curls to (L) with green, LAQ with #3 to (L) , prone hamstring curls with #3 to (L) and # 2(R)     Rationale: increase strength to improve the patients ability to improve standing tolerance     20  min Therapeutic Activity:  [x]  See flow sheet : Step up, lateral step up, bridges, standing marching    Rationale: increase strength  to improve the patients ability to ability to perform stair negotiation     8  min Neuromuscular Re-education:  [x]  See flow sheet :  PPT progression, clamshells   Rationale: increase strength, improve balance and increase proprioception  to improve the patients ability to improve postural stability with balance activities to reduce risk of falls          With   [] TE   [] TA   [] neuro   [] other: Patient Education: [x] Review HEP    [] Progressed/Changed HEP based on:   [] positioning   [] body mechanics   [] transfers   [] heat/ice application    [] other:      Other Objective/Functional Measures:  Introduced standing marching   Pain Level (0-10 scale) post treatment: 0/10     ASSESSMENT/Changes in Function:   Continued good tolerance to weight bearing exercises. Introduced standing marching in parallel bars, noted decreased hip flexion on the L compared to the R. She required constant UE support during standing marching for balance. Continue to progress as tolerated.       Patient will continue to benefit from skilled PT services to modify and progress therapeutic interventions, address functional mobility deficits, address ROM deficits, address strength deficits, analyze and address soft tissue restrictions, assess and modify postural abnormalities and address imbalance/dizziness to attain remaining goals. [x]  See Plan of Care  []  See progress note/recertification  []  See Discharge Summary         Progress towards goals / Updated goals: · Short Term Goals: To be accomplished in  1  weeks:  1. Pt will be IND and compliant with HEP for self-management of symptoms. Current: pt reports compliance with HEP (10/25/21)  · Long Term Goals: To be accomplished in  4  weeks:  1. Pt will improve B hip strength to 4+/5 to improve gait stability to be able to ambulate 2 miles without pain. Progressing 11/15/21, introduced standing marching 11/24/2021  2. Pt will improve core/lumbar strength to 5/5 to improve postural stability with dynamic activities. 75% AROM 11/15/21  3. Pt will improve R Hamstring strength to 5/5 to improve ability to ascend/descend stairs. Met 11/15/21  4. Pt will report 75% reduction of symptoms to be able to sit and drive for 1 hour. Progressing, she is able to sit for 35 min before onset of symptoms. 11/15/21  5. Pt will improve FOTO score to at least 52/100 as a functional indicator of improved mobility.  met 11/15/21    PLAN  [x]  Upgrade activities as tolerated     [x]  Continue plan of care  []  Update interventions per flow sheet       []  Discharge due to:_  [x]  Other: assess goals Osvaldo Lucas, PT 11/24/2021  1209   PM    Future Appointments   Date Time Provider Allison Yoon   11/29/2021 11:30 AM Cheryl Mathias DPT [facility]   12/3/2021 10:45 AM Mateo Braxton, PT [facility]   12/7/2021 11:30 AM Cheryl Mathias DPT [facility]   12/10/2021 11:30 AM Mateo Braxton PT [facility]   12/14/2021 11:30 AM Cheryl Mathias DPT [facility]   12/17/2021 11:30 AM Kofi Fink PTA [facility]   12/20/2021 11:30 AM Mateo Braxton, PT [facility]   12/23/2021 10:00 AM Mateo Braxton, PT [facility]

## 2021-11-29 ENCOUNTER — APPOINTMENT (OUTPATIENT)
Dept: PHYSICAL THERAPY | Age: 61
End: 2021-11-29
Payer: MEDICAID

## 2021-12-03 ENCOUNTER — HOSPITAL ENCOUNTER (OUTPATIENT)
Dept: PHYSICAL THERAPY | Age: 61
Discharge: HOME | End: 2021-12-03
Payer: MEDICAID

## 2021-12-03 ENCOUNTER — APPOINTMENT (OUTPATIENT)
Dept: PHYSICAL THERAPY | Age: 61
End: 2021-12-03
Payer: MEDICAID

## 2021-12-03 PROCEDURE — 97530 THERAPEUTIC ACTIVITIES: CPT

## 2021-12-03 PROCEDURE — 97112 NEUROMUSCULAR REEDUCATION: CPT

## 2021-12-03 PROCEDURE — 97110 THERAPEUTIC EXERCISES: CPT

## 2021-12-03 NOTE — PROGRESS NOTES
PT DAILY TREATMENT NOTE     Patient Name: Evette Yoo  Date:12/3/2021  : 1960  [x]  Patient  Verified  Payor: Conception Dapper / Plan: [facility] / Product Type: Managed Care Medicaid /    In time: 10:45 am  Out time: 11:32 am   Total Treatment Time (min): 47   Visit #: 3 of     Treatment Area: Other low back pain [M54.59]  Right thigh pain [M79.651]    SUBJECTIVE  Pain Level (0-10 scale): 0/10   Any medication changes, allergies to medications, adverse drug reactions, diagnosis change, or new procedure performed?: [x] No    [] Yes (see summary sheet for update)  Subjective functional status/changes:   [x] No changes reported  The pt reports no pain for the last three days and that she has not felt this good in months. She reports the longest she has walked was about 4 blocks.      OBJECTIVE    Modality rationale: decrease inflammation, decrease pain and increase tissue extensibility to improve the patients ability to reduce post session soreness    Min Type Additional Details    [] Estim:  []Unatt       []IFC  []Premod                        []Other:  []w/ice   []w/heat  Position:  Location:    [] Estim: []Att    []TENS instruct  []NMES                    []Other:  []w/US   []w/ice   []w/heat  Position:  Location:    []  Traction: [] Cervical       []Lumbar                       [] Prone          []Supine                       []Intermittent   []Continuous Lbs:  [] before manual  [] after manual    []  Ultrasound: []Continuous   [] Pulsed                           []1MHz   []3MHz W/cm2:  Location:    []  Iontophoresis with dexamethasone         Location: [] Take home patch   [] In clinic   PD [x]  Ice     []  heat  []  Ice massage  []  Laser   []  Anodyne Position: prone  Location: R HS    []  Laser with stim  []  Other:  Position:  Location:    []  Vasopneumatic Device  Pre-treatment girth:   Post-treatment girth:   Measured at landmark location:  Pressure:       [] lo [] med [] hi   Temperature: [] lo [] med [] hi   [x] Skin assessment post-treatment:  [x]intact []redness- no adverse reaction    []redness  adverse reaction:   Vasopnuematic compression justification:  Per bilateral girth measures taken and listed above the edema is considered significant and having an impact on the patient's strength, balance, transfers, self care and ADLs    13/8  min Therapeutic Exercise:  [x] See flow sheet :  Bike  HS/Incline stretch,   piriformis stretch,    seated HS curls to (L) with green, - tc   LAQ with #3 to (L)  - tc   prone hamstring curls with #3 to (L) and # 2(R)  - tc    Rationale: increase strength to improve the patients ability to improve standing tolerance     19  min Therapeutic Activity:  [x]  See flow sheet :   Step up, lateral step up,   bridges,   standing marching   TG DL squats  Mini squats    Rationale: increase strength  to improve the patients ability to ability to perform stair negotiation     15  min Neuromuscular Re-education:  [x]  See flow sheet :    Star taps   PPT progression, - d/c  Clamshells x2   Rationale: increase strength, improve balance and increase proprioception  to improve the patients ability to improve postural stability with balance activities to reduce risk of falls          With   [x] TE   [] TA   [] neuro   [] other: Patient Education: [x] Review HEP    [x] Progressed/Changed HEP based on:  Pt progress   [] positioning   [] body mechanics   [] transfers   [] heat/ice application    [] other:      Other Objective/Functional Measures:  Introduced star taps    Pain Level (0-10 scale) post treatment: 0/10     ASSESSMENT/Changes in Function:   The pt was challenged by star taps in the parallel bars, however performed the exercise without pain increase. Updated HEP today, reviewed with patient. Plan to assess goals nv.      Patient will continue to benefit from skilled PT services to modify and progress therapeutic interventions, address functional mobility deficits, address ROM deficits, address strength deficits, analyze and address soft tissue restrictions, assess and modify postural abnormalities and address imbalance/dizziness to attain remaining goals. []  See Plan of Care  []  See progress note/recertification  []  See Discharge Summary         Progress towards goals / Updated goals: · Short Term Goals: To be accomplished in  1  weeks:  1. Pt will be IND and compliant with HEP for self-management of symptoms. Current: pt reports compliance with HEP (10/25/21)  · Long Term Goals: To be accomplished in  4  weeks:  1. Pt will improve B hip strength to 4+/5 to improve gait stability to be able to ambulate 2 miles without pain. Progressing 11/15/21, introduced standing marching 11/24/2021, introduced star taps and pt reports she was able to walk 2-3 blocks last week 12/03/2021  2. Pt will improve core/lumbar strength to 5/5 to improve postural stability with dynamic activities. 75% AROM 11/15/21  3. Pt will improve R Hamstring strength to 5/5 to improve ability to ascend/descend stairs. Met 11/15/21  4. Pt will report 75% reduction of symptoms to be able to sit and drive for 1 hour. Progressing, she is able to sit for 35 min before onset of symptoms. 11/15/21   5. Pt will improve FOTO score to at least 52/100 as a functional indicator of improved mobility.  met 11/15/21    PLAN  [x]  Upgrade activities as tolerated     [x]  Continue plan of care  []  Update interventions per flow sheet       []  Discharge due to:_  [x]  Other: assess goals Theone Jean Claude, PT 12/3/2021  1209   PM    Future Appointments   Date Time Provider Allison Yoon   12/7/2021 11:30 AM Chiqui Tadeo DPT [facility]   12/10/2021 11:30 AM Chela Berrios PT [facility]   12/14/2021 11:30 AM Chqiui Tadeo DPT [facility]   12/17/2021 11:30 AM Heather Gottlieb PTA [facility]   12/20/2021 11:30 AM Chela Berrios, PT [facility]   12/23/2021 10:00 AM Chela Berrios, PT [facility]

## 2021-12-07 ENCOUNTER — HOSPITAL ENCOUNTER (OUTPATIENT)
Dept: PHYSICAL THERAPY | Age: 61
Discharge: HOME | End: 2021-12-07
Payer: MEDICAID

## 2021-12-07 PROCEDURE — 97110 THERAPEUTIC EXERCISES: CPT | Performed by: PHYSICAL THERAPIST

## 2021-12-07 PROCEDURE — 97530 THERAPEUTIC ACTIVITIES: CPT | Performed by: PHYSICAL THERAPIST

## 2021-12-07 PROCEDURE — 97112 NEUROMUSCULAR REEDUCATION: CPT | Performed by: PHYSICAL THERAPIST

## 2021-12-07 NOTE — PROGRESS NOTES
PT DAILY TREATMENT NOTE     Patient Name: Gin Miller  Date:2021  : 1960  [x]  Patient  Verified  Payor: Hugo Meredith / Plan: [address] [place] [address] 60 / Product Type: Managed Care Medicaid /    In time: 9973   Out time: 8252  Total Treatment Time (min): 47   Visit #: 4 of     Treatment Area: Other low back pain [M54.59]  Right thigh pain [M79.651]    SUBJECTIVE  Pain Level (0-10 scale): 0/10   Any medication changes, allergies to medications, adverse drug reactions, diagnosis change, or new procedure performed?: [x] No    [] Yes (see summary sheet for update)  Subjective functional status/changes:   [x] No changes reported  Pt denies pain in the last 10 days. Pt reports that she is able to drive 25 min without pain. Unable to assess longer because she has not done it yet.      OBJECTIVE    Modality rationale: decrease inflammation, decrease pain and increase tissue extensibility to improve the patients ability to reduce post session soreness    Min Type Additional Details    [] Estim:  []Unatt       []IFC  []Premod                        []Other:  []w/ice   []w/heat  Position:  Location:    [] Estim: []Att    []TENS instruct  []NMES                    []Other:  []w/US   []w/ice   []w/heat  Position:  Location:    []  Traction: [] Cervical       []Lumbar                       [] Prone          []Supine                       []Intermittent   []Continuous Lbs:  [] before manual  [] after manual    []  Ultrasound: []Continuous   [] Pulsed                           []1MHz   []3MHz W/cm2:  Location:    []  Iontophoresis with dexamethasone         Location: [] Take home patch   [] In clinic   PD [x]  Ice     []  heat  []  Ice massage  []  Laser   []  Anodyne Position: prone  Location: R HS    []  Laser with stim  []  Other:  Position:  Location:    []  Vasopneumatic Device  Pre-treatment girth:   Post-treatment girth:   Measured at landmark location:  Pressure:       [] lo [] med [] hi Temperature: [] lo [] med [] hi   [x] Skin assessment post-treatment:  [x]intact []redness- no adverse reaction    []redness  adverse reaction:   Vasopnuematic compression justification:  Per bilateral girth measures taken and listed above the edema is considered significant and having an impact on the patient's strength, balance, transfers, self care and ADLs    15 min Therapeutic Exercise:  [x] See flow sheet :  Bike  HS/Incline stretch,   piriformis stretch,    seated HS curls to (L) with green, - tc   LAQ with #3 to (L)  - tc   prone hamstring curls with #3 to (L) and # 2(R)  - tc    Rationale: increase strength to improve the patients ability to improve standing tolerance     22  min Therapeutic Activity:  [x]  See flow sheet :   Step up, lateral step up,   bridges,   standing marching   TG DL squats  Mini squats    Rationale: increase strength  to improve the patients ability to ability to perform stair negotiation     10  min Neuromuscular Re-education:  [x]  See flow sheet :    Star taps   Clamshells x2   Rationale: increase strength, improve balance and increase proprioception  to improve the patients ability to improve postural stability with balance activities to reduce risk of falls          With   [x] TE   [] TA   [] neuro   [] other: Patient Education: [x] Review HEP    [x] Progressed/Changed HEP based on:  Pt progress   [] positioning   [] body mechanics   [] transfers   [] heat/ice application    [] other:      Other Objective/Functional Measures:  Required UE assist for all balance acts in //  Unable to perform slow march without UE assist due to reduced balance on foam  Increased pain in knees with mini-squats    Pain Level (0-10 scale) post treatment: 0/10     ASSESSMENT/Changes in Function:   Pt performed all therex without increased pain in the Piriformis/HS. She reports compliance with updated HEP.  Continue to progress as tolerated and progress to IND program as pt has been pain free for 10 days. Patient will continue to benefit from skilled PT services to modify and progress therapeutic interventions, address functional mobility deficits, address ROM deficits, address strength deficits, analyze and address soft tissue restrictions, assess and modify postural abnormalities and address imbalance/dizziness to attain remaining goals. []  See Plan of Care  []  See progress note/recertification  []  See Discharge Summary         Progress towards goals / Updated goals:  1. Pt will improve B hip strength to 4+/5 to improve gait stability to be able to ambulate 2 miles without pain. 2. Pt will improve core/lumbar strength to 5/5 to improve postural stability with dynamic activities. 3. Pt will report 75% reduction of symptoms to be able to sit and drive for 1 hour.  Progressing as she has not had pain in 10 days limited by not needing to drive that length yet 12/7/21      PLAN  [x]  Upgrade activities as tolerated     [x]  Continue plan of care  []  Update interventions per flow sheet       []  Discharge due to:_  []  Other:     Saurabh Mancuso DPT 12/7/2021  1249    PM    Future Appointments   Date Time Provider Allison Yoon   12/10/2021 11:30 AM Chela Berrios, PT [facility]   12/14/2021 11:30 AM Chiqui Tadeo DPT [facility]   12/17/2021 11:30 AM Heather Gottlieb PTA [facility]   12/20/2021 11:30 AM [facility] PT JUDY Bartholomew [facility]   12/23/2021 10:00 AM Chela Berrios, PT [facility]

## 2021-12-10 ENCOUNTER — HOSPITAL ENCOUNTER (OUTPATIENT)
Dept: PHYSICAL THERAPY | Age: 61
Discharge: HOME | End: 2021-12-10
Payer: MEDICAID

## 2021-12-10 PROCEDURE — 97112 NEUROMUSCULAR REEDUCATION: CPT

## 2021-12-10 PROCEDURE — 97530 THERAPEUTIC ACTIVITIES: CPT

## 2021-12-10 PROCEDURE — 97110 THERAPEUTIC EXERCISES: CPT

## 2021-12-10 NOTE — PROGRESS NOTES
PT DAILY TREATMENT NOTE     Patient Name: Christin Fraga  Date:12/10/2021  : 1960  [x]  Patient  Verified  Payor: See Goldman / Plan: [address] [place] [address] / Product Type: Managed Care Medicaid /    In time: 11:30   Out time: 12:14   Total Treatment Time (min): 44   Visit #: 5 of     Treatment Area: Other low back pain [M54.59]  Right thigh pain [M79.651]    SUBJECTIVE  Pain Level (0-10 scale): 2/10   Any medication changes, allergies to medications, adverse drug reactions, diagnosis change, or new procedure performed?: [x] No    [] Yes (see summary sheet for update)  Subjective functional status/changes:   [x] No changes reported  The pt reports she was very very sore after last visit, thinks it was from doing exercises on uneven surfaces.     OBJECTIVE    Modality rationale: decrease inflammation, decrease pain and increase tissue extensibility to improve the patients ability to reduce post session soreness    Min Type Additional Details    [] Estim:  []Unatt       []IFC  []Premod                        []Other:  []w/ice   []w/heat  Position:  Location:    [] Estim: []Att    []TENS instruct  []NMES                    []Other:  []w/US   []w/ice   []w/heat  Position:  Location:    []  Traction: [] Cervical       []Lumbar                       [] Prone          []Supine                       []Intermittent   []Continuous Lbs:  [] before manual  [] after manual    []  Ultrasound: []Continuous   [] Pulsed                           []1MHz   []3MHz W/cm2:  Location:    []  Iontophoresis with dexamethasone         Location: [] Take home patch   [] In clinic   PD [x]  Ice     []  heat  []  Ice massage  []  Laser   []  Anodyne Position: prone  Location: R HS    []  Laser with stim  []  Other:  Position:  Location:    []  Vasopneumatic Device  Pre-treatment girth:   Post-treatment girth:   Measured at landmark location:  Pressure:       [] lo [] med [] hi   Temperature: [] lo [] med [] hi [x] Skin assessment post-treatment:  [x]intact []redness- no adverse reaction    []redness  adverse reaction:   Vasopnuematic compression justification:  Per bilateral girth measures taken and listed above the edema is considered significant and having an impact on the patient's strength, balance, transfers, self care and ADLs    13  min Therapeutic Exercise:  [x] See flow sheet :  Bike  HS/Incline stretch,   piriformis stretch,  - tc   seated HS curls to (L) with green, - tc   LAQ with #3 to (L)  - tc   prone hamstring curls with #3 to (L) and # 2(R)  - tc    Rationale: increase strength to improve the patients ability to improve standing tolerance     18  min Therapeutic Activity:  [x]  See flow sheet :   Step up, lateral step up, on yellow disk 4\" stair   bridges,   standing marching   TG DL squats  Mini squats    Rationale: increase strength  to improve the patients ability to ability to perform stair negotiation     13  min Neuromuscular Re-education:  [x]  See flow sheet :    Star taps on foam - held   3-way hip on green disk   Clamshells x2   Rationale: increase strength, improve balance and increase proprioception  to improve the patients ability to improve postural stability with balance activities to reduce risk of falls          With   [x] TE   [] TA   [] neuro   [] other: Patient Education: [x] Review HEP    [x] Progressed/Changed HEP based on:  Pt progress   [] positioning   [] body mechanics   [] transfers   [] heat/ice application    [] other:      Other Objective/Functional Measures:  Unable to perform marching on compliant or noncompliant surface secondary to back pain     Pain Level (0-10 scale) post treatment: 0/10     ASSESSMENT/Changes in Function:   The pt was unable to perform marching on compliant or noncompliant surfaces secondary to increased back pain. The pt was unable to perform star taps on foam, improved tolerance with 3-way hip on green disk.   Plan to assess response to today's session nv. Patient will continue to benefit from skilled PT services to modify and progress therapeutic interventions, address functional mobility deficits, address ROM deficits, address strength deficits, analyze and address soft tissue restrictions, assess and modify postural abnormalities and address imbalance/dizziness to attain remaining goals. []  See Plan of Care  []  See progress note/recertification  []  See Discharge Summary         Progress towards goals / Updated goals:  1. Pt will improve B hip strength to 4+/5 to improve gait stability to be able to ambulate 2 miles without pain. 2. Pt will improve core/lumbar strength to 5/5 to improve postural stability with dynamic activities. 3. Pt will report 75% reduction of symptoms to be able to sit and drive for 1 hour.  Progressing as she has not had pain in 10 days limited by not needing to drive that length yet 12/7/21      PLAN  [x]  Upgrade activities as tolerated     [x]  Continue plan of care  []  Update interventions per flow sheet       []  Discharge due to:_  [x]  Other:     Toro Rincon, PT 12/10/2021  1249    PM    Future Appointments   Date Time Provider Allison Yoon   12/14/2021 11:30 AM [facility]   12/17/2021 11:30 AM Sandra Gutierrez PTA [facility]   12/20/2021 11:30 AM [facility] PT JUDY Bartholomew [facility]   12/23/2021 10:00 AM Clarence Villagomez PT [facility]

## 2021-12-14 ENCOUNTER — HOSPITAL ENCOUNTER (OUTPATIENT)
Dept: PHYSICAL THERAPY | Age: 61
Discharge: HOME | End: 2021-12-14
Payer: MEDICAID

## 2021-12-14 PROCEDURE — 97110 THERAPEUTIC EXERCISES: CPT

## 2021-12-14 PROCEDURE — 97112 NEUROMUSCULAR REEDUCATION: CPT

## 2021-12-14 PROCEDURE — 97530 THERAPEUTIC ACTIVITIES: CPT

## 2021-12-14 NOTE — PROGRESS NOTES
PT DAILY TREATMENT NOTE     Patient Name: Diogo De Paz  Date:2021  : 1960  [x]  Patient  Verified  Payor: Javi Boogie / Plan: [address] [place] [address] / Product Type: Managed Care Medicaid /    In time: 11:35   Out time: 12:30  Total Treatment Time (min): 55  Visit #: 6 of     Treatment Area: Other low back pain [M54.59]  Right thigh pain [M79.651]    SUBJECTIVE  Pain Level (0-10 scale): 0/10   Any medication changes, allergies to medications, adverse drug reactions, diagnosis change, or new procedure performed?: [x] No    [] Yes (see summary sheet for update)  Subjective functional status/changes:   [x] No changes reported  I was able to walk 8 hours at [facility] and did good no problems .     OBJECTIVE    Modality rationale: decrease inflammation, decrease pain and increase tissue extensibility to improve the patients ability to reduce post session soreness    Min Type Additional Details    [] Estim:  []Unatt       []IFC  []Premod                        []Other:  []w/ice   []w/heat  Position:  Location:    [] Estim: []Att    []TENS instruct  []NMES                    []Other:  []w/US   []w/ice   []w/heat  Position:  Location:    []  Traction: [] Cervical       []Lumbar                       [] Prone          []Supine                       []Intermittent   []Continuous Lbs:  [] before manual  [] after manual    []  Ultrasound: []Continuous   [] Pulsed                           []1MHz   []3MHz W/cm2:  Location:    []  Iontophoresis with dexamethasone         Location: [] Take home patch   [] In clinic   PD [x]  Ice     []  heat  []  Ice massage  []  Laser   []  Anodyne Position: prone  Location: R HS    []  Laser with stim  []  Other:  Position:  Location:    []  Vasopneumatic Device  Pre-treatment girth:   Post-treatment girth:   Measured at landmark location:  Pressure:       [] lo [] med [] hi   Temperature: [] lo [] med [] hi   [x] Skin assessment post-treatment: [x]intact []redness- no adverse reaction    []redness  adverse reaction:   Vasopnuematic compression justification:  Per bilateral girth measures taken and listed above the edema is considered significant and having an impact on the patient's strength, balance, transfers, self care and ADLs    15 min Therapeutic Exercise:  [x] See flow sheet :  Bike  HS/Incline stretch,   Bridges  BKFO with green t-band   Rationale: increase strength to improve the patients ability to improve standing tolerance     20 min Therapeutic Activity:  [x]  See flow sheet :   Step up, lateral step up, on yellow disk 4\" stair   bridges,   standing marching on yellow disk   TG DL squats  TG Heelraises  Mini squats    Rationale: increase strength  to improve the patients ability to ability to perform stair negotiation     20  min Neuromuscular Re-education:  [x]  See flow sheet :    Star taps on foam  3-way hip on green disk   Clamshells x2   Rationale: increase strength, improve balance and increase proprioception  to improve the patients ability to improve postural stability with balance activities to reduce risk of falls          With   [] TE   [] TA   [] neuro   [] other: Patient Education: [x] Review HEP    [] Progressed/Changed HEP based on:  Pt progress   [] positioning   [] body mechanics   [] transfers   [] heat/ice application    [] other:      Other Objective/Functional Measures:  Patient was able to perform slow march on yellow foam with no use of hands  Required increase cuing and demonstration with using a chair behind patient to perform mini -squats with proper form and with no use of hands  Patient tolerated step ups, 3 way hip and marching on foam with no increase pain reported      Pain Level (0-10 scale) post treatment: 0/10     ASSESSMENT/Changes in Function:   Patient will continue to benefit from skilled PT services to modify and progress therapeutic interventions, address functional mobility deficits, address ROM deficits, address strength deficits, analyze and address soft tissue restrictions, assess and modify postural abnormalities and address imbalance/dizziness to attain remaining goals. []  See Plan of Care  []  See progress note/recertification  []  See Discharge Summary         Progress towards goals / Updated goals:  1. Pt will improve B hip strength to 4+/5 to improve gait stability to be able to ambulate 2 miles without pain. 2. Pt will improve core/lumbar strength to 5/5 to improve postural stability with dynamic activities. 3. Pt will report 75% reduction of symptoms to be able to sit and drive for 1 hour.  Progressing as she has not had pain in 10 days limited by not needing to drive that length yet 12/7/21      PLAN  [x]  Upgrade activities as tolerated     [x]  Continue plan of care  []  Update interventions per flow sheet       []  Discharge due to:_  []  Other:     Chaim Alvarado, PTA 12/14/2021  1249    PM    Future Appointments   Date Time Provider Allison Yoon   12/17/2021 11:30 AM She Schuler PTA [facility]   12/20/2021 11:30 AM [facility]   12/23/2021 10:00 AM Arabella Vasquez, PT [facility]

## 2021-12-17 ENCOUNTER — HOSPITAL ENCOUNTER (OUTPATIENT)
Dept: PHYSICAL THERAPY | Age: 61
Discharge: HOME | End: 2021-12-17
Payer: MEDICAID

## 2021-12-17 PROCEDURE — 97112 NEUROMUSCULAR REEDUCATION: CPT

## 2021-12-17 PROCEDURE — 97530 THERAPEUTIC ACTIVITIES: CPT

## 2021-12-17 PROCEDURE — 97110 THERAPEUTIC EXERCISES: CPT

## 2021-12-17 NOTE — PROGRESS NOTES
PT DAILY TREATMENT NOTE     Patient Name: Favian Caruso  Date:2021  : 1960  [x]  Patient  Verified  Payor: Catalina Ferro / Plan: [address] [place] [address] 60 / Product Type: Managed Care Medicaid /    In time: 11:33   Out time: 12:15  Total Treatment Time (min): 42  Visit #: 7 of     Treatment Area: Other low back pain [M54.59]  Right thigh pain [M79.651]    SUBJECTIVE  Pain Level (0-10 scale): 0/10   Any medication changes, allergies to medications, adverse drug reactions, diagnosis change, or new procedure performed?: [x] No    [] Yes (see summary sheet for update)  Subjective functional status/changes:   [x] No changes reported  Pt reports 75% overall improvement with functional ADL's since beginning PT. Pt's pain range 0-3/10. \"It is a zero more than it is painful. \"  Patient has demonstrated the following functional improvements in therapy: improved left leg strength, decrease in pain, no difficulty getting in/out car, no difficulty descending stairs, 1.25hrs sitting tolerance on a pillow, unlimited walking tolerance. Patient continues to need work on: improve tolerance ascending stairs, continue to decrease pain, continue to strengthen core and LEs.      OBJECTIVE    Modality rationale: decrease inflammation, decrease pain and increase tissue extensibility to improve the patients ability to reduce post session soreness    Min Type Additional Details    [] Estim:  []Unatt       []IFC  []Premod                        []Other:  []w/ice   []w/heat  Position:  Location:    [] Estim: []Att    []TENS instruct  []NMES                    []Other:  []w/US   []w/ice   []w/heat  Position:  Location:    []  Traction: [] Cervical       []Lumbar                       [] Prone          []Supine                       []Intermittent   []Continuous Lbs:  [] before manual  [] after manual    []  Ultrasound: []Continuous   [] Pulsed                           []1MHz   []3MHz W/cm2:  Location:    [] Iontophoresis with dexamethasone         Location: [] Take home patch   [] In clinic   PD [x]  Ice     []  heat  []  Ice massage  []  Laser   []  Anodyne Position: prone  Location: R HS    []  Laser with stim  []  Other:  Position:  Location:    []  Vasopneumatic Device  Pre-treatment girth:   Post-treatment girth:   Measured at landmark location:  Pressure:       [] lo [] med [] hi   Temperature: [] lo [] med [] hi   [x] Skin assessment post-treatment:  [x]intact []redness- no adverse reaction    []redness  adverse reaction:   Vasopnuematic compression justification:  Per bilateral girth measures taken and listed above the edema is considered significant and having an impact on the patient's strength, balance, transfers, self care and ADLs    12 min Therapeutic Exercise:  [x] See flow sheet :  Bike  HS/Incline stretch,   Bridges  BKFO with green t-band   Rationale: increase strength to improve the patients ability to improve standing tolerance     15 min Therapeutic Activity:  [x]  See flow sheet :   Step up on yellow disk 4\" stair   bridges  standing marching on yellow disk   TG DL squats  TG Heelraises  Mini squats    Rationale: increase strength  to improve the patients ability to ability to perform stair negotiation     15  min Neuromuscular Re-education:  [x]  See flow sheet :    Star taps on foam  3-way hip on green disk   Clamshells x2   Rationale: increase strength, improve balance and increase proprioception  to improve the patients ability to improve postural stability with balance activities to reduce risk of falls          With   [x] TE   [] TA   [] neuro   [] other: Patient Education: [x] Review HEP and provided blue TB    [] Progressed/Changed HEP based on:  Pt progress   [] positioning   [] body mechanics   [] transfers   [] heat/ice application    [] other:      Other Objective/Functional Measures:    FOTO: improved to 69/100   Bridge: 75% no pain  B hip strength: ext 3+, abd 4+, flex 4/5 Pain Level (0-10 scale) post treatment: 0/10     ASSESSMENT/Changes in Function: SEE DISCHARGE SUMMARY     []  See Plan of Care  []  See progress note/recertification  [x]  See Discharge Summary         Progress towards goals / Updated goals:  1. Pt will improve B hip strength to 4+/5 to improve gait stability to be able to ambulate 2 miles without pain. Progressing (12/17/21)  2. Pt will improve core/lumbar strength to 5/5 to improve postural stability with dynamic activities. Progressing (12/17/21)  3. Pt will report 75% reduction of symptoms to be able to sit and drive for 1 hour. Progressing as she has not had pain in 10 days limited by not needing to drive that length yet 12/7/21, pt able to drive over 1 hr sitting on a pillow without pain.  (12/17/21) MET    PLAN  [x]  Upgrade activities as tolerated     [x]  Continue plan of care  []  Update interventions per flow sheet       [x]  Discharge due to: pt has met or is progressing towards all goals  []  Other:     Alberto Hester 12/17/2021  12:15 PM    Future Appointments   Date Time Provider Allison Yoon   12/17/2021 11:30 AM [address] [facility]   12/23/2021 10:00 AM Bertin Newton PT [facility]

## 2021-12-20 ENCOUNTER — APPOINTMENT (OUTPATIENT)
Dept: PHYSICAL THERAPY | Age: 61
End: 2021-12-20
Payer: MEDICAID

## 2021-12-23 ENCOUNTER — APPOINTMENT (OUTPATIENT)
Dept: PHYSICAL THERAPY | Age: 61
End: 2021-12-23
Payer: MEDICAID

## 2023-01-31 RX ORDER — ASCORBIC ACID 500 MG
500 TABLET ORAL
COMMUNITY

## 2023-01-31 RX ORDER — LANOLIN ALCOHOL/MO/W.PET/CERES
325 CREAM (GRAM) TOPICAL DAILY
COMMUNITY

## 2023-01-31 RX ORDER — DESLORATADINE 5 MG/1
5 TABLET ORAL DAILY
COMMUNITY

## 2023-01-31 RX ORDER — INSULIN GLARGINE 300 U/ML
40 INJECTION, SOLUTION SUBCUTANEOUS
COMMUNITY
Start: 2016-07-14

## 2023-01-31 RX ORDER — ATORVASTATIN CALCIUM 10 MG/1
TABLET, FILM COATED ORAL DAILY
COMMUNITY

## 2023-01-31 RX ORDER — CARVEDILOL 12.5 MG/1
TABLET ORAL
COMMUNITY
Start: 2018-05-10

## 2023-01-31 RX ORDER — ASPIRIN 81 MG/1
81 TABLET, CHEWABLE ORAL DAILY
COMMUNITY

## 2023-01-31 RX ORDER — ALPRAZOLAM 0.25 MG/1
TABLET ORAL DAILY PRN
COMMUNITY

## 2023-01-31 RX ORDER — ONDANSETRON 4 MG/1
4 TABLET, FILM COATED ORAL EVERY 8 HOURS PRN
COMMUNITY
Start: 2017-05-03

## 2023-01-31 RX ORDER — PANTOPRAZOLE SODIUM 40 MG/1
40 TABLET, DELAYED RELEASE ORAL DAILY
COMMUNITY

## 2023-01-31 RX ORDER — FUROSEMIDE 20 MG/1
101 TABLET ORAL DAILY
COMMUNITY

## 2023-01-31 RX ORDER — FENOFIBRATE 134 MG/1
CAPSULE ORAL
COMMUNITY
Start: 2016-06-17

## 2023-01-31 RX ORDER — SITAGLIPTIN AND METFORMIN HYDROCHLORIDE 1000; 50 MG/1; MG/1
TABLET, FILM COATED ORAL
COMMUNITY
Start: 2016-06-17

## 2023-01-31 RX ORDER — MONTELUKAST SODIUM 10 MG/1
10 TABLET ORAL DAILY
COMMUNITY

## 2023-01-31 RX ORDER — SPIRONOLACTONE 25 MG/1
25 TABLET ORAL DAILY
COMMUNITY